# Patient Record
Sex: FEMALE | Race: WHITE | NOT HISPANIC OR LATINO | URBAN - METROPOLITAN AREA
[De-identification: names, ages, dates, MRNs, and addresses within clinical notes are randomized per-mention and may not be internally consistent; named-entity substitution may affect disease eponyms.]

---

## 2017-01-05 ENCOUNTER — POST OP (OUTPATIENT)
Dept: URBAN - METROPOLITAN AREA CLINIC 44 | Facility: CLINIC | Age: 72
End: 2017-01-05
Payer: MEDICARE

## 2017-01-05 PROCEDURE — 92015 DETERMINE REFRACTIVE STATE: CPT | Performed by: OPTOMETRIST

## 2017-01-05 PROCEDURE — 99024 POSTOP FOLLOW-UP VISIT: CPT | Performed by: OPTOMETRIST

## 2017-01-05 ASSESSMENT — INTRAOCULAR PRESSURE
OS: 14
OD: 14

## 2017-01-05 ASSESSMENT — VISUAL ACUITY
OD: 20/20
OS: 20/60

## 2017-02-23 ENCOUNTER — FOLLOW UP ESTABLISHED (OUTPATIENT)
Dept: URBAN - METROPOLITAN AREA CLINIC 51 | Facility: CLINIC | Age: 72
End: 2017-02-23
Payer: MEDICARE

## 2017-02-23 DIAGNOSIS — H43.813 VITREOUS DEGENERATION, BILATERAL: ICD-10-CM

## 2017-02-23 DIAGNOSIS — H26.491 OTHER SECONDARY CATARACT, RIGHT EYE: Primary | ICD-10-CM

## 2017-02-23 PROCEDURE — 92014 COMPRE OPH EXAM EST PT 1/>: CPT | Performed by: OPTOMETRIST

## 2017-02-23 PROCEDURE — 92134 CPTRZ OPH DX IMG PST SGM RTA: CPT | Performed by: OPTOMETRIST

## 2017-02-23 ASSESSMENT — VISUAL ACUITY
OD: 20/20
OS: 20/125

## 2017-02-23 ASSESSMENT — INTRAOCULAR PRESSURE
OD: 18
OS: 18

## 2017-02-23 ASSESSMENT — KERATOMETRY
OS: 44.24
OD: 44.09

## 2017-03-28 ENCOUNTER — Encounter (OUTPATIENT)
Dept: URBAN - METROPOLITAN AREA CLINIC 44 | Facility: CLINIC | Age: 72
End: 2017-03-28
Payer: MEDICARE

## 2017-03-28 PROCEDURE — 99213 OFFICE O/P EST LOW 20 MIN: CPT | Performed by: PHYSICIAN ASSISTANT

## 2017-04-04 ENCOUNTER — SURGERY (OUTPATIENT)
Dept: URBAN - METROPOLITAN AREA SURGERY 19 | Facility: SURGERY | Age: 72
End: 2017-04-04
Payer: MEDICARE

## 2017-04-04 PROCEDURE — 66821 AFTER CATARACT LASER SURGERY: CPT | Performed by: OPHTHALMOLOGY

## 2017-04-14 ENCOUNTER — POST OP (OUTPATIENT)
Dept: URBAN - METROPOLITAN AREA CLINIC 44 | Facility: CLINIC | Age: 72
End: 2017-04-14

## 2017-04-14 PROCEDURE — 99024 POSTOP FOLLOW-UP VISIT: CPT | Performed by: OPTOMETRIST

## 2017-04-14 ASSESSMENT — INTRAOCULAR PRESSURE
OS: 18
OD: 15

## 2017-04-14 ASSESSMENT — VISUAL ACUITY
OD: 20/20
OS: 20/200

## 2017-10-13 ENCOUNTER — FOLLOW UP ESTABLISHED (OUTPATIENT)
Dept: URBAN - METROPOLITAN AREA CLINIC 44 | Facility: CLINIC | Age: 72
End: 2017-10-13
Payer: MEDICARE

## 2017-10-13 DIAGNOSIS — H04.123 DRY EYE SYNDROME OF BILATERAL LACRIMAL GLANDS: ICD-10-CM

## 2017-10-13 DIAGNOSIS — H35.3223 EXUDATIVE AGE-REL MCLR DEGN, LEFT EYE, WITH INACTIVE SCAR: ICD-10-CM

## 2017-10-13 DIAGNOSIS — H35.3111 NEXDTVE AGE-RELATED MCLR DEGN, RIGHT EYE, EARLY DRY STAGE: Primary | ICD-10-CM

## 2017-10-13 PROCEDURE — 92015 DETERMINE REFRACTIVE STATE: CPT | Performed by: OPTOMETRIST

## 2017-10-13 PROCEDURE — 92134 CPTRZ OPH DX IMG PST SGM RTA: CPT | Performed by: OPTOMETRIST

## 2017-10-13 PROCEDURE — 92014 COMPRE OPH EXAM EST PT 1/>: CPT | Performed by: OPTOMETRIST

## 2017-10-13 ASSESSMENT — KERATOMETRY
OS: 44.13
OD: 44.13

## 2017-10-13 ASSESSMENT — VISUAL ACUITY
OS: 20/150
OD: 20/25

## 2017-10-13 ASSESSMENT — INTRAOCULAR PRESSURE
OS: 17
OD: 14

## 2019-02-06 ENCOUNTER — FOLLOW UP ESTABLISHED (OUTPATIENT)
Dept: URBAN - METROPOLITAN AREA CLINIC 44 | Facility: CLINIC | Age: 74
End: 2019-02-06
Payer: MEDICARE

## 2019-02-06 PROCEDURE — 92014 COMPRE OPH EXAM EST PT 1/>: CPT | Performed by: OPTOMETRIST

## 2019-02-06 PROCEDURE — 92015 DETERMINE REFRACTIVE STATE: CPT | Performed by: OPTOMETRIST

## 2019-02-06 PROCEDURE — 92134 CPTRZ OPH DX IMG PST SGM RTA: CPT | Performed by: OPTOMETRIST

## 2019-02-06 ASSESSMENT — INTRAOCULAR PRESSURE
OS: 21
OD: 21

## 2019-02-06 ASSESSMENT — VISUAL ACUITY
OD: 20/25
OS: 20/60

## 2019-02-06 ASSESSMENT — KERATOMETRY
OS: 44.13
OD: 44.00

## 2020-10-05 ENCOUNTER — FOLLOW UP ESTABLISHED (OUTPATIENT)
Dept: URBAN - METROPOLITAN AREA CLINIC 44 | Facility: CLINIC | Age: 75
End: 2020-10-05
Payer: MEDICARE

## 2020-10-05 DIAGNOSIS — Z96.1 PRESENCE OF INTRAOCULAR LENS: ICD-10-CM

## 2020-10-05 PROCEDURE — 92014 COMPRE OPH EXAM EST PT 1/>: CPT | Performed by: OPTOMETRIST

## 2020-10-05 PROCEDURE — 92134 CPTRZ OPH DX IMG PST SGM RTA: CPT | Performed by: OPTOMETRIST

## 2020-10-05 ASSESSMENT — INTRAOCULAR PRESSURE
OD: 18
OS: 18

## 2020-10-05 ASSESSMENT — KERATOMETRY
OD: 44.75
OS: 43.75

## 2020-10-05 ASSESSMENT — VISUAL ACUITY
OS: 20/60
OD: 20/20

## 2020-11-12 ENCOUNTER — NEW PATIENT (OUTPATIENT)
Dept: URBAN - METROPOLITAN AREA CLINIC 44 | Facility: CLINIC | Age: 75
End: 2020-11-12
Payer: MEDICARE

## 2020-11-12 PROCEDURE — 92242 FLUORESCEIN&ICG ANGIOGRAPHY: CPT | Performed by: OPHTHALMOLOGY

## 2020-11-12 PROCEDURE — 92004 COMPRE OPH EXAM NEW PT 1/>: CPT | Performed by: OPHTHALMOLOGY

## 2020-11-12 PROCEDURE — 92134 CPTRZ OPH DX IMG PST SGM RTA: CPT | Performed by: OPHTHALMOLOGY

## 2020-11-12 ASSESSMENT — INTRAOCULAR PRESSURE
OS: 17
OD: 18

## 2021-02-11 ENCOUNTER — FOLLOW UP ESTABLISHED (OUTPATIENT)
Dept: URBAN - METROPOLITAN AREA CLINIC 44 | Facility: CLINIC | Age: 76
End: 2021-02-11
Payer: MEDICARE

## 2021-02-11 DIAGNOSIS — H35.3222 EXUDATIVE AGE-RELATED MACULAR DEGENERATION, LEFT EYE, WITH INACTIVE CHOROIDAL NEOVASCULARIZATION: Primary | ICD-10-CM

## 2021-02-11 PROCEDURE — 92242 FLUORESCEIN&ICG ANGIOGRAPHY: CPT | Performed by: OPHTHALMOLOGY

## 2021-02-11 PROCEDURE — 92014 COMPRE OPH EXAM EST PT 1/>: CPT | Performed by: OPHTHALMOLOGY

## 2021-02-11 PROCEDURE — 92134 CPTRZ OPH DX IMG PST SGM RTA: CPT | Performed by: OPHTHALMOLOGY

## 2021-02-11 ASSESSMENT — INTRAOCULAR PRESSURE
OS: 16
OD: 19

## 2021-04-01 ENCOUNTER — OFFICE VISIT (OUTPATIENT)
Dept: URBAN - METROPOLITAN AREA CLINIC 44 | Facility: CLINIC | Age: 76
End: 2021-04-01
Payer: MEDICARE

## 2021-04-01 ASSESSMENT — VISUAL ACUITY
OS: 20/70
OD: 20/20

## 2021-04-01 ASSESSMENT — KERATOMETRY
OS: 44.13
OD: 44.00

## 2021-04-01 NOTE — IMPRESSION/PLAN
Impression: Presbyopia: H52.4. Plan: Release new glasses prescription. Patient understands vision will be limited in OS due to macular degeneration.

## 2021-10-29 ENCOUNTER — OFFICE VISIT (OUTPATIENT)
Dept: URBAN - METROPOLITAN AREA CLINIC 44 | Facility: CLINIC | Age: 76
End: 2021-10-29
Payer: MEDICARE

## 2021-10-29 DIAGNOSIS — H10.45 OTHER CHRONIC ALLERGIC CONJUNCTIVITIS: Primary | ICD-10-CM

## 2021-10-29 PROCEDURE — 99213 OFFICE O/P EST LOW 20 MIN: CPT | Performed by: OPTOMETRIST

## 2021-10-29 ASSESSMENT — INTRAOCULAR PRESSURE
OD: 16
OS: 15

## 2021-10-29 NOTE — IMPRESSION/PLAN
Impression: Other chronic allergic conjunctivitis: H10.45. Plan: Start pataday QD OU. Also start AT's q2h. RTC if no improvement.

## 2022-02-14 ENCOUNTER — OFFICE VISIT (OUTPATIENT)
Dept: URBAN - METROPOLITAN AREA CLINIC 44 | Facility: CLINIC | Age: 77
End: 2022-02-14
Payer: MEDICARE

## 2022-02-14 DIAGNOSIS — H52.4 PRESBYOPIA: ICD-10-CM

## 2022-02-14 PROCEDURE — 92134 CPTRZ OPH DX IMG PST SGM RTA: CPT | Performed by: OPTOMETRIST

## 2022-02-14 PROCEDURE — 99214 OFFICE O/P EST MOD 30 MIN: CPT | Performed by: OPTOMETRIST

## 2022-02-14 ASSESSMENT — VISUAL ACUITY
OS: 20/100
OD: 20/25

## 2022-02-14 ASSESSMENT — KERATOMETRY: OD: 43.63

## 2022-02-14 ASSESSMENT — INTRAOCULAR PRESSURE
OS: 15
OD: 16

## 2022-02-14 NOTE — IMPRESSION/PLAN
Impression: Vitreous degeneration, bilateral Plan: Patient educated on findings. Retina attached 360 RTC asap if notice symptoms of RD (reviewed with patient)

## 2022-02-14 NOTE — IMPRESSION/PLAN
Impression: Exudative macular degeneration, with inactive choroidal neovascularization, left eye Plan: pattern dystrophy variant, longstanding. stable. no obvious CNV vessels. Overdue to see Dr. Angelina Bray -- schedule appt next available.

## 2022-03-28 ENCOUNTER — OFFICE VISIT (OUTPATIENT)
Dept: URBAN - METROPOLITAN AREA CLINIC 44 | Facility: CLINIC | Age: 77
End: 2022-03-28
Payer: MEDICARE

## 2022-03-28 PROCEDURE — 92134 CPTRZ OPH DX IMG PST SGM RTA: CPT | Performed by: OPHTHALMOLOGY

## 2022-03-28 PROCEDURE — 99214 OFFICE O/P EST MOD 30 MIN: CPT | Performed by: OPHTHALMOLOGY

## 2022-03-28 ASSESSMENT — INTRAOCULAR PRESSURE
OS: 13
OD: 15

## 2022-03-28 NOTE — IMPRESSION/PLAN
Impression: Exudative macular degeneration, with inactive choroidal neovascularization, left eye Plan: pattern dystrophy variant, longstanding. stable. no obvious CNV vessels. no tx required monitor. call if any vision changes RTC 1 yr

## 2023-03-31 ENCOUNTER — OFFICE VISIT (OUTPATIENT)
Dept: URBAN - METROPOLITAN AREA CLINIC 44 | Facility: CLINIC | Age: 78
End: 2023-03-31
Payer: MEDICARE

## 2023-03-31 DIAGNOSIS — H43.813 VITREOUS DEGENERATION, BILATERAL: ICD-10-CM

## 2023-03-31 DIAGNOSIS — H35.3222 EXUDATIVE AGE-RELATED MACULAR DEGENERATION, LEFT EYE, WITH INACTIVE CHOROIDAL NEOVASCULARIZATION: Primary | ICD-10-CM

## 2023-03-31 PROCEDURE — 92134 CPTRZ OPH DX IMG PST SGM RTA: CPT | Performed by: OPHTHALMOLOGY

## 2023-03-31 PROCEDURE — 99214 OFFICE O/P EST MOD 30 MIN: CPT | Performed by: OPHTHALMOLOGY

## 2023-03-31 ASSESSMENT — INTRAOCULAR PRESSURE
OS: 17
OD: 17

## 2023-03-31 NOTE — IMPRESSION/PLAN
Impression: Exudative macular degeneration, with inactive choroidal neovascularization, left eye Plan: pattern dystrophy variant, longstanding. stable. no obvious CNV vessels. no tx required, monitor in general. call if any vision changes RTC 1 yr general

## 2023-09-28 ENCOUNTER — OFFICE VISIT (OUTPATIENT)
Dept: URBAN - METROPOLITAN AREA CLINIC 44 | Facility: CLINIC | Age: 78
End: 2023-09-28
Payer: COMMERCIAL

## 2023-09-28 DIAGNOSIS — H04.123 TEAR FILM INSUFFICIENCY OF BILATERAL LACRIMAL GLANDS: Primary | ICD-10-CM

## 2023-09-28 DIAGNOSIS — H52.4 PRESBYOPIA: ICD-10-CM

## 2023-09-28 DIAGNOSIS — Z96.1 PRESENCE OF INTRAOCULAR LENS: ICD-10-CM

## 2023-09-28 PROCEDURE — 99213 OFFICE O/P EST LOW 20 MIN: CPT | Performed by: OPTOMETRIST

## 2023-09-28 ASSESSMENT — KERATOMETRY
OS: 44.25
OD: 44.00

## 2023-09-28 ASSESSMENT — VISUAL ACUITY
OD: 20/25
OS: 20/250

## 2024-12-04 ENCOUNTER — OFFICE VISIT (OUTPATIENT)
Dept: URBAN - METROPOLITAN AREA CLINIC 44 | Facility: CLINIC | Age: 79
End: 2024-12-04
Payer: COMMERCIAL

## 2024-12-04 DIAGNOSIS — Z96.1 PRESENCE OF INTRAOCULAR LENS: ICD-10-CM

## 2024-12-04 DIAGNOSIS — H04.123 TEAR FILM INSUFFICIENCY OF BILATERAL LACRIMAL GLANDS: Primary | ICD-10-CM

## 2024-12-04 DIAGNOSIS — H52.4 PRESBYOPIA: ICD-10-CM

## 2024-12-04 DIAGNOSIS — H43.813 VITREOUS DEGENERATION, BILATERAL: ICD-10-CM

## 2024-12-04 DIAGNOSIS — H35.3222 EXUDATIVE AGE-RELATED MACULAR DEGENERATION, LEFT EYE, WITH INACTIVE CHOROIDAL NEOVASCULARIZATION: ICD-10-CM

## 2024-12-04 PROCEDURE — 99214 OFFICE O/P EST MOD 30 MIN: CPT | Performed by: OPTOMETRIST

## 2024-12-04 PROCEDURE — 92134 CPTRZ OPH DX IMG PST SGM RTA: CPT | Performed by: OPTOMETRIST

## 2024-12-04 ASSESSMENT — INTRAOCULAR PRESSURE
OD: 12
OS: 13

## 2024-12-04 ASSESSMENT — VISUAL ACUITY
OS: 20/200
OD: 20/20

## 2024-12-04 ASSESSMENT — KERATOMETRY
OD: 43.75
OS: 44.25

## 2024-12-11 ENCOUNTER — APPOINTMENT (OUTPATIENT)
Dept: URBAN - METROPOLITAN AREA CLINIC 158 | Facility: CLINIC | Age: 79
Setting detail: DERMATOLOGY
End: 2024-12-11

## 2024-12-11 DIAGNOSIS — Z87.2 PERSONAL HISTORY OF DISEASES OF THE SKIN AND SUBCUTANEOUS TISSUE: ICD-10-CM

## 2024-12-11 DIAGNOSIS — L81.4 OTHER MELANIN HYPERPIGMENTATION: ICD-10-CM

## 2024-12-11 DIAGNOSIS — L90.8 OTHER ATROPHIC DISORDERS OF SKIN: ICD-10-CM

## 2024-12-11 PROCEDURE — ? PATIENT SPECIFIC COUNSELING

## 2024-12-11 PROCEDURE — 99203 OFFICE O/P NEW LOW 30 MIN: CPT

## 2024-12-11 PROCEDURE — ? COUNSELING

## 2024-12-11 PROCEDURE — ? TREATMENT REGIMEN

## 2024-12-11 ASSESSMENT — LOCATION DETAILED DESCRIPTION DERM: LOCATION DETAILED: RIGHT LATERAL MALAR CHEEK

## 2024-12-11 ASSESSMENT — LOCATION ZONE DERM: LOCATION ZONE: FACE

## 2024-12-11 ASSESSMENT — LOCATION SIMPLE DESCRIPTION DERM: LOCATION SIMPLE: RIGHT CHEEK

## 2024-12-11 NOTE — PROCEDURE: PATIENT SPECIFIC COUNSELING
2024: Had a biopsy done of a lesion on the glabellar area and was told to RTC for LN2.\\nOn return, dermatologist said nothing remained, no LN2 necessary. This is c/w an actinic keratosis. Site is clear
Detail Level: Zone
Atrophy of the epidermis and dermis, most pronounced on the sun exposed forearms with loss of dermal collagen, hair, and atrophy of glands, resulting in skin fragility, is due mostly to sun associated collagen degradation, but insufficient protein and vitamins and minerals may also contribution to this tissue loss. Rec getting at very minimum 75 gm of protein per day, preferably 3 meals a day and no snacks.\\nResources online: Dr Vineet Monaco and Dr Douglas Amezquita/Dr Crystal Linares on dietary protein and lean body mass preservation

## 2024-12-11 NOTE — HPI: SKIN LESION
Is This A New Presentation, Or A Follow-Up?: Growths
Additional History: Full body was done a few months ago dermatologist, but wants face and scalp check only.

## 2024-12-11 NOTE — PROCEDURE: TREATMENT REGIMEN
Plan: Bx proven recently by dermatologist she saw this year earlier this year (2024)
Detail Level: Simple